# Patient Record
Sex: FEMALE | URBAN - METROPOLITAN AREA
[De-identification: names, ages, dates, MRNs, and addresses within clinical notes are randomized per-mention and may not be internally consistent; named-entity substitution may affect disease eponyms.]

---
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
## 2023-02-08 ENCOUNTER — APPOINTMENT (OUTPATIENT)
Dept: TRANSGENDER CARE | Facility: CLINIC | Age: 18
End: 2023-02-08
Payer: MEDICAID

## 2023-02-08 VITALS
HEART RATE: 89 BPM | SYSTOLIC BLOOD PRESSURE: 108 MMHG | WEIGHT: 278 LBS | BODY MASS INDEX: 46.32 KG/M2 | HEIGHT: 65 IN | DIASTOLIC BLOOD PRESSURE: 72 MMHG | OXYGEN SATURATION: 97 % | TEMPERATURE: 97.2 F

## 2023-02-08 DIAGNOSIS — D68.9 COAGULATION DEFECT, UNSPECIFIED: ICD-10-CM

## 2023-02-08 PROCEDURE — 99214 OFFICE O/P EST MOD 30 MIN: CPT

## 2023-02-08 NOTE — ASSESSMENT
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 Elevated testosterone prior to GAHT 56. PRL normal, TSH normal\par -Discussed that PCOS is a complex genetic disorder with a number of candidate genes and we do not know the exact cause so we treat the symptoms. This is treated differently in a patient who is undergoing gender affirming hormonal transition.\par - discussed that  PCOS means that people are at increased risk of metabolic syndrome, hypertension, and diabetes and that pt will need monitoring throughout their lifetime to minimize risk.\par - pt does not want any future pregnancies/has no fertility concerns\par - advised pt bring prior records for me to review from prior endocrinologist\par - discussed importance of sustained lifestyle modifications to lose weight, improve BMI, glycemic control and hypertriglyceridemia\par - continue metformin 1000 mg bid.\par - gender affirming care below --> on testosterone menses will ideally stop. Pt does not want to be on OCP as this would contradict their masculinizing goals of GAHT care\par - pt will obtain prior labs from outside endocrinologist --> defer further workup at this time until this is reviewed. Pt mentions ?abnormal salivary cortisol testing but was told they have "adrenal fatigue" - discussed that this is not a clinical entity in endocrinology. Would stop taking adrenal supplements. Low clinical suspicion for hypercortisolism.\par \par - GYN has started progesterone monthly to induce menses - can continue for first 6 months on GAHT. Lydia would like to establish care with [facility] so will refer to Dr. Frank\par \par #Gender dysphoria in adult:\par - Discussed options for gender-affirming hormone therapy, goals, risks (erythrocytosis, acne, male pattern hair loss, infertility, increased CV risk) and benefits of therapy, as well as options for fertility preservation (cryopreservation of oocytes/embryos), and surgical intervention. 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

## 2023-02-08 NOTE — HISTORY OF PRESENT ILLNESS
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 Always felt nonbinary masc even when younger. Raised by father and around men and felt they fit in with them. Lives with mom and dad who are  for 10 years, relays home can be very stressful, not out to family. Patient would like to talk to endocrinologist first and psych in house first prior to deciding on hormones. "\par \par Has been talking with therapist. \par Wants to be on testosterone but is concerned about OCD impact.\par Wants to go slower with testosterone, lower doses.\par Received mental health [facility] letter of support.\par \par Menstrual hx\par menarche 5th grade, stopped for months, then was regular only on OCP. Got only one period 7/2022. \par Reports being put on progesterone monthly to induce menses with GYN\par On metformin 1000 mg BID - has been on since 6th grade\par diagnosed by endocrinologist\par no acne history\par has always had dark hair on face, some mild chest hair, nothing excessive but hoping for more\par Was on OCP sixth grade until recently 3/2022, felt like testosterone levels gone up and feels much better, feels like mental health has improved. \par No galactorrhea, or thyroid disease.\par \par Thinks they had abnormal salivary cortisol testing  - thinks they did it throughout the day \par reports lower cortisol and was told they have adrenal fatigue \par taking adrenavive - was told it was for adrenal fatigue\par denies purple stretch marks, denies easy bruising, fracture history, no facial roundness \par is not sure if they are seeing a functional med doctor\par \par Labs:\par testosterone elevated 56\par PRL 19\par TSH 2.28\par A1c 5.2%\par Lipids: Tchol 160, , HDL 30, LDL 95\par \par Was seeing endo.\par On metformin.\par \par no concerns  for fertility, does not want  children \par \par corrected calcium = 10 on 10/14/22\par  \par FHx: Denies breast/ovarian cancer, VTE, CAD/CVA\par  \par SH:\par nonsmoker\par Occupation works at [employer]\par 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

## 2023-02-10 ENCOUNTER — APPOINTMENT (OUTPATIENT)
Dept: TRANSGENDER CARE | Facility: CLINIC | Age: 18
End: 2023-02-10

## 2023-04-12 ENCOUNTER — APPOINTMENT (OUTPATIENT)
Dept: TRANSGENDER CARE | Facility: CLINIC | Age: 18
End: 2023-04-12
Payer: MEDICAID

## 2023-04-12 VITALS
OXYGEN SATURATION: 98 % | HEIGHT: 65 IN | SYSTOLIC BLOOD PRESSURE: 112 MMHG | BODY MASS INDEX: 46.98 KG/M2 | HEART RATE: 82 BPM | TEMPERATURE: 98.4 F | DIASTOLIC BLOOD PRESSURE: 82 MMHG | WEIGHT: 282 LBS

## 2023-04-12 DIAGNOSIS — Z11.3 ENCOUNTER FOR SCREENING FOR INFECTIONS WITH A PREDOMINANTLY SEXUAL MODE OF TRANSMISSION: ICD-10-CM

## 2023-04-12 PROCEDURE — 99215 OFFICE O/P EST HI 40 MIN: CPT

## 2023-04-13 ENCOUNTER — NON-APPOINTMENT (OUTPATIENT)
Age: 18
End: 2023-04-13

## 2023-04-13 LAB
25(OH)D3 SERPL-MCNC: 44.9 NG/ML
ALBUMIN SERPL ELPH-MCNC: 4.5 G/DL
ALP BLD-CCNC: 57 U/L
ALT SERPL-CCNC: 48 U/L
ANION GAP SERPL CALC-SCNC: 15 MMOL/L
AST SERPL-CCNC: 25 U/L
BASOPHILS # BLD AUTO: 0.04 K/UL
BASOPHILS NFR BLD AUTO: 0.5 %
BILIRUB SERPL-MCNC: 0.2 MG/DL
BUN SERPL-MCNC: 19 MG/DL
C TRACH RRNA SPEC QL NAA+PROBE: NOT DETECTED
CALCIUM SERPL-MCNC: 10.3 MG/DL
CALCIUM SERPL-MCNC: 10.3 MG/DL
CHLORIDE SERPL-SCNC: 103 MMOL/L
CHOLEST SERPL-MCNC: 145 MG/DL
CO2 SERPL-SCNC: 24 MMOL/L
CREAT SERPL-MCNC: 0.56 MG/DL
EGFR: 135 ML/MIN/1.73M2
EOSINOPHIL # BLD AUTO: 0.11 K/UL
EOSINOPHIL NFR BLD AUTO: 1.4 %
ESTIMATED AVERAGE GLUCOSE: 105 MG/DL
ESTRADIOL SERPL-MCNC: 34 PG/ML
GLUCOSE SERPL-MCNC: 94 MG/DL
HBA1C MFR BLD HPLC: 5.3 %
HCG SERPL-MCNC: <1 MIU/ML
HCT VFR BLD CALC: 41 %
HDLC SERPL-MCNC: 24 MG/DL
HGB BLD-MCNC: 12.7 G/DL
IMM GRANULOCYTES NFR BLD AUTO: 0.6 %
LDLC SERPL CALC-MCNC: 82 MG/DL
LYMPHOCYTES # BLD AUTO: 2.13 K/UL
LYMPHOCYTES NFR BLD AUTO: 27.4 %
MAN DIFF?: NORMAL
MCHC RBC-ENTMCNC: 28.9 PG
MCHC RBC-ENTMCNC: 31 GM/DL
MCV RBC AUTO: 93.2 FL
MONOCYTES # BLD AUTO: 1.06 K/UL
MONOCYTES NFR BLD AUTO: 13.7 %
N GONORRHOEA RRNA SPEC QL NAA+PROBE: NOT DETECTED
NEUTROPHILS # BLD AUTO: 4.37 K/UL
NEUTROPHILS NFR BLD AUTO: 56.4 %
NONHDLC SERPL-MCNC: 121 MG/DL
PARATHYROID HORMONE INTACT: 13 PG/ML
PLATELET # BLD AUTO: 345 K/UL
POTASSIUM SERPL-SCNC: 4.2 MMOL/L
PROT SERPL-MCNC: 7 G/DL
RBC # BLD: 4.4 M/UL
RBC # FLD: 13.5 %
SODIUM SERPL-SCNC: 141 MMOL/L
SOURCE AMPLIFICATION: NORMAL
T PALLIDUM AB SER QL IA: NEGATIVE
TESTOST SERPL-MCNC: 611 NG/DL
TRIGL SERPL-MCNC: 195 MG/DL
WBC # FLD AUTO: 7.76 K/UL

## 2023-04-13 NOTE — ASSESSMENT
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 Elevated testosterone prior to [facility] 56. PRL normal, TSH normal\par -Discussed that PCOS is a complex genetic disorder with a number of candidate genes and we do not know the exact cause so we treat the symptoms. This is treated differently in a patient who is undergoing gender affirming hormonal transition.\par - discussed that  PCOS means that people are at increased risk of metabolic syndrome, hypertension, and diabetes and that pt will need monitoring throughout their lifetime to minimize risk.\par - pt does not want any future pregnancies/has no fertility concerns\par - advised pt bring prior records for me to review from prior endocrinologist\par - discussed importance of sustained lifestyle modifications to lose weight, improve BMI, glycemic control and hypertriglyceridemia\par - continue metformin 1000 mg bid. not interested in GLP1 agonist\par - gender affirming care below --> on testosterone menses will stop. Pt does not want to be on OCP as this would contradict their masculinizing goals of [facility] care\par - pt will obtain prior labs from outside endocrinologist --> defer further workup at this time until this is reviewed. Pt mentions ?abnormal salivary cortisol testing but was told they have "adrenal fatigue" - discussed that this is not a clinical entity in endocrinology. Would stop taking adrenal supplements. Low clinical suspicion for hypercortisolism.\par - now off progesterone from GYN\par - previously referred pt to Dr. Frank\par \par #Gender dysphoria in adult:\par - Discussed options for gender-affirming hormone therapy, goals, risks (erythrocytosis, acne, male pattern hair loss, infertility, increased CV risk) and benefits of therapy, as well as options for fertility preservation (cryopreservation of oocytes/embryos), and surgical intervention. The patient is not interested in fertility preservation.\par -Expected timeline for changes to occur (cessation of menses, deep voice, increase in muscle bulk, acne, increased libido) in 3-6 months, longer term changes include male pattern hair loss, clitoral enlargement.\par -Counseled that pregnancy can still occur on GAHT and that patient must avoid pregnancy while taking GAHT \par - continue testosterone gel 1.62% 2 pumps daily (started in 2/2023)\par \par  Pt requesting HCG testing. Understands pregnancy is possible while on GAHT. Discussed safe sex practices. Pt expresses dysphoria by the idea of becoming pregnant. Reports they would "kill themselves" if pregnant. Pt denies active plan to harm their self or others, does not have weapons at home and does not have active thoughts to harm themselves now. Understands that if pregnant, termination would be an option. 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

## 2023-04-13 NOTE — HISTORY OF PRESENT ILLNESS
[FreeTextEntry1] : CC: Gender affirming hormone therapy\par HPI: Lydia is a 18 yo AFAB transmasculine nonbinary (he/they) person with PMH of PCOS, PANDAS, OCD, YULIA, depression,  migraines, +MTFHR gene (reports increased clotting risk), here for gender affirming hormone evaluation.\par \par Transition hx: per visit with NP Gemini Keller in 10/2022\par "Out to friends, relays they aren’t taken very seriously but that they also don’t talk about it a lot. Always felt nonbinary masc even when younger. Raised by father and around men and felt they fit in with them. Lives with mom and dad who are  for 10 years, relays home can be very stressful, not out to family. Patient would like to talk to endocrinologist first and psych in house first prior to deciding on hormones. "\par \par Has been talking with therapist. \par Wants to be on testosterone but is concerned about OCD impact.\par Wants to go slower with testosterone, lower doses.\par Received mental health [facility] letter of support.\par \par Menstrual hx\par menarche 5th grade, stopped for months, then was regular only on OCP. Got only one period 7/2022. \par Reports being put on progesterone monthly to induce menses with GYN\par On metformin 1000 mg BID - has been on since 6th grade\par diagnosed by endocrinologist\par no acne history\par has always had dark hair on face, some mild chest hair, nothing excessive but hoping for more\par Was on OCP sixth grade until recently 3/2022, felt like testosterone levels gone up and feels much better, feels like mental health has improved. \par No galactorrhea, or thyroid disease.\par \par Thinks they had abnormal salivary cortisol testing  - thinks they did it throughout the day \par reports lower cortisol and was told they have adrenal fatigue \par taking adrenavive - was told it was for adrenal fatigue\par denies purple stretch marks, denies easy bruising, fracture history, no facial roundness \par is not sure if they are seeing a functional med doctor\par \par Labs:\par testosterone elevated 56\par PRL 19\par TSH 2.28\par A1c 5.2%\par Lipids: Tchol 160, , HDL 30, LDL 95\par \par Was seeing endo.\par On metformin 1 g bid. 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 \par \par interval hx:\par on testosterone gel 1.62% 2 pumps daily (started in 2/2023)\par feels like getting more hormone related migraine, gets overheated, feels like they can manage symptoms\par unsure if they want to reduce dosing; has been having spikes of anxiety since starting testosterone, does not want to take a break\par has gained 10 lbs, walks a lot \par has been eating more \par reports eating disorder in the past\par seeing therapist \par not interested in glp1\par has increased hair growth\par no menses\par off progesterone \par two weeks ago had intercourse with cis male, worried about  pregnancy, reports severe dysphoria by the idea that their body can become pregnant. If pregnant "would kill themselves" - pt denies active plan, does not have weapons at home and does not have active thoughts to harm themselves now. Understands that if pregnant, termination would be an option. \par \par cell [phone]\par \par \par

## 2023-06-28 ENCOUNTER — RX RENEWAL (OUTPATIENT)
Age: 18
End: 2023-06-28

## 2023-08-30 ENCOUNTER — APPOINTMENT (OUTPATIENT)
Dept: TRANSGENDER CARE | Facility: CLINIC | Age: 18
End: 2023-08-30
Payer: MEDICAID

## 2023-08-30 VITALS
WEIGHT: 293 LBS | HEART RATE: 85 BPM | HEIGHT: 65 IN | SYSTOLIC BLOOD PRESSURE: 110 MMHG | BODY MASS INDEX: 48.82 KG/M2 | DIASTOLIC BLOOD PRESSURE: 82 MMHG | OXYGEN SATURATION: 100 % | TEMPERATURE: 98 F

## 2023-08-30 DIAGNOSIS — Z86.39 PERSONAL HISTORY OF OTHER ENDOCRINE, NUTRITIONAL AND METABOLIC DISEASE: ICD-10-CM

## 2023-08-30 PROCEDURE — 99214 OFFICE O/P EST MOD 30 MIN: CPT

## 2023-08-30 NOTE — ASSESSMENT
[FreeTextEntry1] : Lydia is a 17 yo AFAB transmasculine nonbinary (he/they) person with PMH of PCOS, PANDAS, OCD, YULIA, depression,  migraines, +MTFHR gene (increased clotting risk), here for gender affirming hormone evaluation.  #PCOS: suspect PCOS based on clinical hx (meets NIH criteria by irregular menses/chronic anovulation, biochemical hyperandrogenism) and other etiologies ruled out. Elevated testosterone prior to [facility] 56. PRL normal, TSH normal -Discussed that PCOS is a complex genetic disorder with a number of candidate genes and we do not know the exact cause so we treat the symptoms. This is treated differently in a patient who is undergoing gender affirming hormonal transition. - discussed that PCOS means that people are at increased risk of metabolic syndrome, hypertension, and diabetes and that pt will need monitoring throughout their lifetime to minimize risk. - pt does not want any future pregnancies/has no fertility concerns - advised pt bring prior records for me to review from prior endocrinologist - discussed importance of sustained lifestyle modifications to lose weight, improve BMI, glycemic control and hypertriglyceridemia - continue metformin 1000 mg bid. not interested in GLP1 agonist; can  reduce to 1 tab daily while having GI issues  - gender affirming care below --> on testosterone menses will stop. Pt does not want to be on OCP as this would contradict their masculinizing goals of [facility] care - pt will obtain prior labs from outside endocrinologist --> defer further workup at this time until this is reviewed. Pt mentions ?abnormal salivary cortisol testing but was told they have "adrenal fatigue" - previously discussed that this is not a clinical entity in endocrinology. Would stop taking adrenal supplements. Low clinical suspicion for hypercortisolism. - now off progesterone from GYN - previously referred pt to Dr. Frank  #Gender dysphoria in adult: - Discussed options for gender-affirming hormone therapy, goals, risks (erythrocytosis, acne, male pattern hair loss, infertility, increased CV risk) and benefits of therapy, as well as options for fertility preservation (cryopreservation of oocytes/embryos), and surgical intervention. The patient is not interested in fertility preservation. -Expected timeline for changes to occur (cessation of menses, deep voice, increase in muscle bulk, acne, increased libido) in 3-6 months, longer term changes include male pattern hair loss, clitoral enlargement. -Counseled that pregnancy can still occur on GAHT and that patient must avoid pregnancy while taking GAHT  - continue testosterone gel 1.62% 2 pumps daily (started in 2/2023), consider injections at future visit per pt preference  -Understands pregnancy is possible while on GAHT. Discussed safe sex practices. Pt expresses dysphoria by the idea of becoming pregnant.   #+MTFHR gene-increased clotting risk reported - referred to hematology for further evaluation previously  - discussed increased VTE risk on testosterone  #hypercalcemia, resolved - PTH low, normal calcium - monitor   Seeing GI for abdominal pain next week can check celiac panel follow up with pcp  Return for follow up visit in 2 months cell [phone]

## 2023-08-30 NOTE — HISTORY OF PRESENT ILLNESS
[FreeTextEntry1] : CC: Gender affirming hormone therapy HPI: Lydia is a 17 yo AFAB transmasculine nonbinary (he/they) person with PMH of PCOS, PANDAS, OCD, YULIA, depression,  migraines, +MTFHR gene (reports increased clotting risk), here for gender affirming hormone evaluation.  Transition hx: per visit with NP Gemini Keller in 10/2022 "Out to friends, relays they aren't taken very seriously but that they also don't talk about it a lot. Always felt nonbinary masc even when younger. Raised by father and around men and felt they fit in with them. Lives with mom and dad who are  for 10 years, relays home can be very stressful, not out to family. Patient would like to talk to endocrinologist first and psych in house first prior to deciding on hormones. "  Has been talking with therapist.  Wants to be on testosterone but is concerned about OCD impact. Wants to go slower with testosterone, lower doses. Received mental health [facility] letter of support.  Menstrual hx menarche 5th grade, stopped for months, then was regular only on OCP. Got only one period 7/2022.  Reports being put on progesterone monthly to induce menses with GYN On metformin 1000 mg BID - has been on since 6th grade diagnosed by endocrinologist no acne history has always had dark hair on face, some mild chest hair, nothing excessive but hoping for more Was on OCP sixth grade until recently 3/2022, felt like testosterone levels gone up and feels much better, feels like mental health has improved.  No galactorrhea, or thyroid disease.  Thinks they had abnormal salivary cortisol testing  - thinks they did it throughout the day  reports lower cortisol and was told they have adrenal fatigue  taking adrenavive - was told it was for adrenal fatigue denies purple stretch marks, denies easy bruising, fracture history, no facial roundness  is not sure if they are seeing a [facility] med doctor  Labs: testosterone elevated 56 PRL 19 TSH 2.28 A1c 5.2% Lipids: Tchol 160, , HDL 30, LDL 95  Was seeing endo. On metformin 1 g bid.   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 notes that they are on antibiotics and reports they are on this intermittently for ?chronic strep infection in brain  no  discharge no unprotected intercourse  does not want std testing today  not interested in glp1 has increased hair growth no menses off progesterone   cell [phone]

## 2023-08-31 ENCOUNTER — TRANSCRIPTION ENCOUNTER (OUTPATIENT)
Age: 18
End: 2023-08-31

## 2023-08-31 ENCOUNTER — NON-APPOINTMENT (OUTPATIENT)
Age: 18
End: 2023-08-31

## 2023-08-31 LAB
ALBUMIN SERPL ELPH-MCNC: 4.5 G/DL
ALP BLD-CCNC: 58 U/L
ALT SERPL-CCNC: 53 U/L
ANION GAP SERPL CALC-SCNC: 12 MMOL/L
AST SERPL-CCNC: 27 U/L
BASOPHILS # BLD AUTO: 0.03 K/UL
BASOPHILS NFR BLD AUTO: 0.4 %
BILIRUB SERPL-MCNC: 0.2 MG/DL
BUN SERPL-MCNC: 15 MG/DL
CALCIUM SERPL-MCNC: 9.8 MG/DL
CHLORIDE SERPL-SCNC: 103 MMOL/L
CHOLEST SERPL-MCNC: 133 MG/DL
CO2 SERPL-SCNC: 25 MMOL/L
CREAT SERPL-MCNC: 0.59 MG/DL
EGFR: 132 ML/MIN/1.73M2
ENDOMYSIUM IGA SER QL: NEGATIVE
ENDOMYSIUM IGA TITR SER: NORMAL
EOSINOPHIL # BLD AUTO: 0.14 K/UL
EOSINOPHIL NFR BLD AUTO: 1.7 %
ESTIMATED AVERAGE GLUCOSE: 111 MG/DL
ESTRADIOL SERPL-MCNC: 36 PG/ML
GLIADIN IGA SER QL: <5 UNITS
GLIADIN IGG SER QL: <5 UNITS
GLIADIN PEPTIDE IGA SER-ACNC: NEGATIVE
GLIADIN PEPTIDE IGG SER-ACNC: NEGATIVE
GLUCOSE SERPL-MCNC: 108 MG/DL
HBA1C MFR BLD HPLC: 5.5 %
HCT VFR BLD CALC: 41.5 %
HDLC SERPL-MCNC: 25 MG/DL
HGB BLD-MCNC: 13.5 G/DL
IMM GRANULOCYTES NFR BLD AUTO: 0.5 %
LDLC SERPL CALC-MCNC: 78 MG/DL
LYMPHOCYTES # BLD AUTO: 2.88 K/UL
LYMPHOCYTES NFR BLD AUTO: 35.8 %
MAN DIFF?: NORMAL
MCHC RBC-ENTMCNC: 29.3 PG
MCHC RBC-ENTMCNC: 32.5 GM/DL
MCV RBC AUTO: 90.2 FL
MONOCYTES # BLD AUTO: 0.71 K/UL
MONOCYTES NFR BLD AUTO: 8.8 %
NEUTROPHILS # BLD AUTO: 4.24 K/UL
NEUTROPHILS NFR BLD AUTO: 52.8 %
NONHDLC SERPL-MCNC: 109 MG/DL
PLATELET # BLD AUTO: 276 K/UL
POTASSIUM SERPL-SCNC: 4.6 MMOL/L
PROT SERPL-MCNC: 6.9 G/DL
RBC # BLD: 4.6 M/UL
RBC # FLD: 13.2 %
SODIUM SERPL-SCNC: 140 MMOL/L
TESTOST SERPL-MCNC: 131 NG/DL
TRIGL SERPL-MCNC: 179 MG/DL
TTG IGA SER IA-ACNC: <1.2 U/ML
TTG IGA SER-ACNC: NEGATIVE
TTG IGG SER IA-ACNC: 2.4 U/ML
TTG IGG SER IA-ACNC: NEGATIVE
WBC # FLD AUTO: 8.04 K/UL

## 2023-09-05 ENCOUNTER — TRANSCRIPTION ENCOUNTER (OUTPATIENT)
Age: 18
End: 2023-09-05

## 2023-09-06 ENCOUNTER — APPOINTMENT (OUTPATIENT)
Dept: GASTROENTEROLOGY | Facility: CLINIC | Age: 18
End: 2023-09-06
Payer: MEDICAID

## 2023-09-06 VITALS
HEART RATE: 67 BPM | BODY MASS INDEX: 48.82 KG/M2 | OXYGEN SATURATION: 98 % | WEIGHT: 293 LBS | HEIGHT: 65 IN | DIASTOLIC BLOOD PRESSURE: 85 MMHG | SYSTOLIC BLOOD PRESSURE: 129 MMHG | TEMPERATURE: 97.2 F

## 2023-09-06 DIAGNOSIS — R11.0 NAUSEA: ICD-10-CM

## 2023-09-06 DIAGNOSIS — R19.5 OTHER FECAL ABNORMALITIES: ICD-10-CM

## 2023-09-06 DIAGNOSIS — R10.9 UNSPECIFIED ABDOMINAL PAIN: ICD-10-CM

## 2023-09-06 DIAGNOSIS — K92.89 OTHER SPECIFIED DISEASES OF THE DIGESTIVE SYSTEM: ICD-10-CM

## 2023-09-06 DIAGNOSIS — K58.9 IRRITABLE BOWEL SYNDROME W/OUT DIARRHEA: ICD-10-CM

## 2023-09-06 DIAGNOSIS — R11.2 NAUSEA WITH VOMITING, UNSPECIFIED: ICD-10-CM

## 2023-09-06 PROCEDURE — 99204 OFFICE O/P NEW MOD 45 MIN: CPT

## 2023-09-06 NOTE — PHYSICAL EXAM
[Alert] : alert [Normal Voice/Communication] : normal voice/communication [Healthy Appearing] : healthy appearing [No Acute Distress] : no acute distress [Sclera] : the sclera and conjunctiva were normal [Hearing Threshold Finger Rub Not Sunflower] : hearing was normal [Normal Lips/Gums] : the lips and gums were normal [Oropharynx] : the oropharynx was normal [Normal Appearance] : the appearance of the neck was normal [No Neck Mass] : no neck mass was observed [No Respiratory Distress] : no respiratory distress [No Acc Muscle Use] : no accessory muscle use [Respiration, Rhythm And Depth] : normal respiratory rhythm and effort [Auscultation Breath Sounds / Voice Sounds] : lungs were clear to auscultation bilaterally [Heart Rate And Rhythm] : heart rate was normal and rhythm regular [Normal S1, S2] : normal S1 and S2 [Murmurs] : no murmurs [Bowel Sounds] : normal bowel sounds [No Masses] : no abdominal mass palpated [Abdomen Soft] : soft [] : no hepatosplenomegaly [RUQ] : RUQ [Oriented To Time, Place, And Person] : oriented to person, place, and time

## 2023-09-06 NOTE — HISTORY OF PRESENT ILLNESS
[FreeTextEntry1] : Few months of nausea, vomiting, and indigestion   Currently on Amoxicillin for PANDAS condition takes usually  On Metformin for PCOS

## 2023-09-06 NOTE — ASSESSMENT
[FreeTextEntry1] : A low acid / reflux diet was discussed in great detail including not smoking, not drinking alcohol, and not consuming foods that irritate the esophagus. It is helpful to eat small meals throughout the day instead of large meals. You should avoid eating before bedtime or lying down after you eat. It can be helpful to raise the head of your bed six inches. Additionally, you should maintain a healthy weight and good posture.. The patient was given written material to take home and review.

## 2023-09-07 ENCOUNTER — LABORATORY RESULT (OUTPATIENT)
Age: 18
End: 2023-09-07

## 2023-09-07 ENCOUNTER — APPOINTMENT (OUTPATIENT)
Dept: ULTRASOUND IMAGING | Facility: CLINIC | Age: 18
End: 2023-09-07
Payer: MEDICAID

## 2023-09-07 PROCEDURE — 76700 US EXAM ABDOM COMPLETE: CPT

## 2023-09-11 LAB
BARLEY IGE QN: <0.1 KUA/L
CHERRY IGE QN: <0.1 KUA/L
COW MILK IGE QN: <0.1 KUA/L
CRAB IGE QN: <0.1 KUA/L
DEPRECATED BARLEY IGE RAST QL: 0
DEPRECATED CHERRY IGE RAST QL: 0
DEPRECATED COW MILK IGE RAST QL: 0
DEPRECATED CRAB IGE RAST QL: 0
DEPRECATED EGG WHITE IGE RAST QL: 0
DEPRECATED OAT IGE RAST QL: 0
DEPRECATED PEANUT IGE RAST QL: 0
DEPRECATED RYE IGE RAST QL: 0
DEPRECATED SOYBEAN IGE RAST QL: 0
DEPRECATED WHEAT IGE RAST QL: NORMAL
EGG WHITE IGE QN: <0.1 KUA/L
OAT IGE QN: <0.1 KUA/L
PEANUT IGE QN: <0.1 KUA/L
RYE IGE QN: <0.1 KUA/L
SOYBEAN IGE QN: <0.1 KUA/L
TOTAL IGE SMQN RAST: 498 KU/L
TSH SERPL-ACNC: 2.57 UIU/ML
WHEAT IGE QN: 0.12 KUA/L

## 2023-09-15 ENCOUNTER — NON-APPOINTMENT (OUTPATIENT)
Age: 18
End: 2023-09-15

## 2023-09-18 LAB
CELIAC DISEASE INTERPRETATION: NORMAL
CELIAC GENE PAIRS PRESENT: NO
DQ ALPHA 1: NORMAL
DQ BETA 1: NORMAL
IMMUNOGLOBULIN A (IGA): 100 MG/DL

## 2023-09-19 ENCOUNTER — TRANSCRIPTION ENCOUNTER (OUTPATIENT)
Age: 18
End: 2023-09-19

## 2023-09-21 LAB — HEMOCCULT STL QL IA: NEGATIVE

## 2023-09-25 ENCOUNTER — NON-APPOINTMENT (OUTPATIENT)
Age: 18
End: 2023-09-25

## 2023-09-26 LAB
BACTERIA STL CULT: NORMAL
H PYLORI AG STL QL: NEGATIVE

## 2023-09-27 ENCOUNTER — RX RENEWAL (OUTPATIENT)
Age: 18
End: 2023-09-27

## 2023-09-29 LAB
CALPROTECTIN FECAL: 24 UG/G
LACTOFERRIN STL-MCNC: 2.81 CD:794062635

## 2023-10-02 LAB — PANCREATIC ELASTASE, FECAL: 286 CD:794062645

## 2023-10-04 ENCOUNTER — APPOINTMENT (OUTPATIENT)
Dept: TRANSGENDER CARE | Facility: CLINIC | Age: 18
End: 2023-10-04
Payer: MEDICAID

## 2023-10-04 VITALS
DIASTOLIC BLOOD PRESSURE: 78 MMHG | OXYGEN SATURATION: 99 % | WEIGHT: 272 LBS | SYSTOLIC BLOOD PRESSURE: 110 MMHG | BODY MASS INDEX: 45.32 KG/M2 | TEMPERATURE: 97.8 F | HEART RATE: 46 BPM | HEIGHT: 65 IN

## 2023-10-04 DIAGNOSIS — E28.2 POLYCYSTIC OVARIAN SYNDROME: ICD-10-CM

## 2023-10-04 PROCEDURE — 99214 OFFICE O/P EST MOD 30 MIN: CPT

## 2023-10-06 ENCOUNTER — TRANSCRIPTION ENCOUNTER (OUTPATIENT)
Age: 18
End: 2023-10-06

## 2023-10-09 ENCOUNTER — TRANSCRIPTION ENCOUNTER (OUTPATIENT)
Age: 18
End: 2023-10-09

## 2023-10-09 LAB
ALBUMIN SERPL ELPH-MCNC: 4.7 G/DL
ALP BLD-CCNC: 53 U/L
ALT SERPL-CCNC: 58 U/L
ANION GAP SERPL CALC-SCNC: 14 MMOL/L
AST SERPL-CCNC: 31 U/L
BASOPHILS # BLD AUTO: 0.03 K/UL
BASOPHILS NFR BLD AUTO: 0.5 %
BILIRUB SERPL-MCNC: 0.5 MG/DL
BUN SERPL-MCNC: 13 MG/DL
CALCIUM SERPL-MCNC: 10.2 MG/DL
CHLORIDE SERPL-SCNC: 102 MMOL/L
CO2 SERPL-SCNC: 22 MMOL/L
CREAT SERPL-MCNC: 0.67 MG/DL
EGFR: 128 ML/MIN/1.73M2
EOSINOPHIL # BLD AUTO: 0.15 K/UL
EOSINOPHIL NFR BLD AUTO: 2.6 %
ESTRADIOL SERPL-MCNC: 30 PG/ML
GLUCOSE SERPL-MCNC: 99 MG/DL
HCT VFR BLD CALC: 45.4 %
HGB BLD-MCNC: 14.1 G/DL
IMM GRANULOCYTES NFR BLD AUTO: 0 %
LYMPHOCYTES # BLD AUTO: 2.41 K/UL
LYMPHOCYTES NFR BLD AUTO: 42 %
MAN DIFF?: NORMAL
MCHC RBC-ENTMCNC: 29.1 PG
MCHC RBC-ENTMCNC: 31.1 GM/DL
MCV RBC AUTO: 93.6 FL
MONOCYTES # BLD AUTO: 0.64 K/UL
MONOCYTES NFR BLD AUTO: 11.1 %
NEUTROPHILS # BLD AUTO: 2.51 K/UL
NEUTROPHILS NFR BLD AUTO: 43.8 %
PLATELET # BLD AUTO: 252 K/UL
POTASSIUM SERPL-SCNC: 4.1 MMOL/L
PROT SERPL-MCNC: 7.3 G/DL
RBC # BLD: 4.85 M/UL
RBC # FLD: 12.8 %
SODIUM SERPL-SCNC: 138 MMOL/L
TESTOST SERPL-MCNC: 242 NG/DL
WBC # FLD AUTO: 5.74 K/UL

## 2023-10-09 RX ORDER — ISOPROPYL ALCOHOL 70 ML/100ML
SWAB TOPICAL
Qty: 1 | Refills: 3 | Status: ACTIVE | COMMUNITY
Start: 2023-10-09 | End: 1900-01-01

## 2023-10-09 RX ORDER — TESTOSTERONE 20.25 MG/1.25G
1.62 GEL, METERED TRANSDERMAL
Qty: 75 | Refills: 0 | Status: DISCONTINUED | COMMUNITY
Start: 2023-09-27 | End: 2023-10-09

## 2023-10-09 RX ORDER — TESTOSTERONE 16.2 MG/G
20.25 MG/ACT GEL TRANSDERMAL
Qty: 1 | Refills: 0 | Status: DISCONTINUED | COMMUNITY
Start: 2023-02-08 | End: 2023-10-09

## 2023-10-10 ENCOUNTER — TRANSCRIPTION ENCOUNTER (OUTPATIENT)
Age: 18
End: 2023-10-10

## 2023-11-03 ENCOUNTER — TRANSCRIPTION ENCOUNTER (OUTPATIENT)
Age: 18
End: 2023-11-03

## 2023-11-07 ENCOUNTER — RX RENEWAL (OUTPATIENT)
Age: 18
End: 2023-11-07

## 2023-11-17 NOTE — ED BEHAVIORAL HEALTH ASSESSMENT NOTE - ELEVATED CHRONIC RISK
Dutasteride Pregnancy And Lactation Text: This medication is absolutely contraindicated in women, especially during pregnancy and breast feeding. Feminization of male fetuses is possible if taking while pregnant. Yes

## 2023-11-29 ENCOUNTER — TRANSCRIPTION ENCOUNTER (OUTPATIENT)
Age: 18
End: 2023-11-29

## 2023-12-01 ENCOUNTER — TRANSCRIPTION ENCOUNTER (OUTPATIENT)
Age: 18
End: 2023-12-01

## 2023-12-12 ENCOUNTER — TRANSCRIPTION ENCOUNTER (OUTPATIENT)
Age: 18
End: 2023-12-12

## 2023-12-12 RX ORDER — ONDANSETRON 4 MG/1
4 TABLET ORAL
Qty: 30 | Refills: 0 | Status: ACTIVE | COMMUNITY
Start: 2023-09-06 | End: 1900-01-01

## 2024-02-21 ENCOUNTER — APPOINTMENT (OUTPATIENT)
Dept: TRANSGENDER CARE | Facility: CLINIC | Age: 19
End: 2024-02-21
Payer: MEDICAID

## 2024-02-21 VITALS
HEIGHT: 65 IN | HEART RATE: 75 BPM | SYSTOLIC BLOOD PRESSURE: 112 MMHG | DIASTOLIC BLOOD PRESSURE: 78 MMHG | WEIGHT: 262 LBS | OXYGEN SATURATION: 95 % | BODY MASS INDEX: 43.65 KG/M2 | TEMPERATURE: 97.5 F

## 2024-02-21 DIAGNOSIS — F64.9 GENDER IDENTITY DISORDER, UNSPECIFIED: ICD-10-CM

## 2024-02-21 DIAGNOSIS — N95.2 POSTMENOPAUSAL ATROPHIC VAGINITIS: ICD-10-CM

## 2024-02-21 DIAGNOSIS — B34.9 VIRAL INFECTION, UNSPECIFIED: ICD-10-CM

## 2024-02-21 DIAGNOSIS — F60.3 BORDERLINE PERSONALITY DISORDER: ICD-10-CM

## 2024-02-21 PROCEDURE — 36415 COLL VENOUS BLD VENIPUNCTURE: CPT

## 2024-02-21 PROCEDURE — 99215 OFFICE O/P EST HI 40 MIN: CPT

## 2024-02-21 RX ORDER — RIMEGEPANT SULFATE 75 MG/75MG
75 TABLET, ORALLY DISINTEGRATING ORAL EVERY OTHER DAY
Refills: 0 | Status: COMPLETED | COMMUNITY
Start: 2022-10-12 | End: 2024-02-21

## 2024-02-21 RX ORDER — AMOXICILLIN AND CLAVULANATE POTASSIUM 875; 125 MG/1; MG/1
875-125 TABLET, COATED ORAL DAILY
Refills: 0 | Status: COMPLETED | COMMUNITY
Start: 2022-10-12 | End: 2024-02-21

## 2024-02-21 RX ORDER — SUMATRIPTAN 100 MG/1
100 TABLET, FILM COATED ORAL
Qty: 12 | Refills: 1 | Status: COMPLETED | COMMUNITY
Start: 2022-10-12 | End: 2024-02-21

## 2024-02-21 RX ORDER — FLUTICASONE PROPIONATE 50 UG/1
50 SPRAY, METERED NASAL TWICE DAILY
Qty: 1 | Refills: 2 | Status: ACTIVE | COMMUNITY
Start: 2024-02-21 | End: 1900-01-01

## 2024-02-21 RX ORDER — MULTIVIT WITH MIN/MFOLATE/K2 340-15/3 G
POWDER (GRAM) ORAL
Refills: 0 | Status: COMPLETED | COMMUNITY
Start: 2022-10-12 | End: 2024-02-21

## 2024-02-21 RX ORDER — ESTRADIOL 0.1 MG/G
0.1 CREAM VAGINAL
Qty: 1 | Refills: 2 | Status: ACTIVE | COMMUNITY
Start: 2024-02-21 | End: 1900-01-01

## 2024-02-21 NOTE — HEALTH RISK ASSESSMENT
[PHQ-2 Positive] : PHQ-2 Positive [PHQ-9 Positive] : PHQ-9 Positive [I have developed a follow-up plan documented below in the note.] : I have developed a follow-up plan documented below in the note. [HIV test declined] : HIV test declined [Hepatitis C test declined] : Hepatitis C test declined [PapSmearComments] : @19 referred to gyn

## 2024-02-21 NOTE — HISTORY OF PRESENT ILLNESS
[FreeTextEntry1] : f/u [de-identified] : Patient here today for gaht f/u.  Has been on gaht for past year. They report taking it day a later than used to, school has been so busy. He is feeling good on gaht. He has been on injections since late oct. He feels same on it mostly the same as gel.   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[employer] going back up there today  MH: patient following with psychiatry and therapist, feels stable on medications denies any thoughts of hurting self or anyone else  Sexual Hx: Relationship status: in relationship Partners (tell me about the genders and bodies of partners, any sperm producing partners): sperm producing partners Practices (types of sex, monogamous/polyamorous): monogamous Protections from STIs (condoms, OCP, LARC, PrEP, etc.): using condoms Past Hx of STIs: none Pregnancy (Desire or Hx): none LMP: menses stopped on t, last march  Patient reports 1 sexual partners in the last 3 months. Last sexual encounter was yesterday  He reports having bleeding with intercourse, denies any pain. He thinks he could be experiencing vaginal atrophy with the testosterone. He reports neurologist advised never to take estradiol/ocp d/t patient's migraine with aura.   Patient has virus currently, feeling fine and having sore throat, sinus pain, and congestion. Tested covid negative. They are taking dayquil, gets some relief. He reports s/s are getting better.   Patient denies any headache visual changes, dizziness, chest pain, SOB, palpitations, wheezing, cough, abdominal pain, nausea, vomiting, diarrhea, joint pain, leg swelling or leg pain. No other health concerns today.

## 2024-02-21 NOTE — PLAN
[FreeTextEntry1] : Gender Dysphoria: endo notes reviewed and appreciated. stable. Will order routine blood work and f/u results to patient once made available - will send [facility] message with results and dose adjustment if needed. F/u 3 months endo. Advised patient to call with any questions or concerns. Patient verbalized understanding.   BPD: stable, patient denies any thoughts of hurting self or anyone else at time of visit. Continue care under therapist/psych. Advised patient to call with any questions or concerns. Patient verbalized understanding.   Viral Syndrome: Discussed with patient likely allergic/viral. Advised patient to use flonase 1 spray in each nostril once in the morning and once before bed, mucinex 600 mg q12h, zyrtec 10mg 1qd, and tylenol prn pain as per package instructions not to exceed 4g. Educated patient to call/f/u if no improvement or worsening symptoms. Advised to go to ED with any high fevers, dysphagia, sob, chest pain, palpitations, worsening headache etc. Advised patient to call with any questions or concerns. Patient verbalized understanding.   Vaginal Atrophy: start estrace cream and f/u gyn referrals provided. Discussed following up with neurologist prior to initiating cream. Advised patient low systemic absorption localized cream, stop med with any migraines with aura. Educated pt to go to hospital with any worst headache of life/thunderclap headache, gait disturbances, chest pain, palpitations, acute neuro changes (slurred speech, muscle weakness, numbness/tingling, visual disturbances etc.) .Advised patient to call with any questions or concerns. Patient verbalized understanding.

## 2024-02-22 ENCOUNTER — TRANSCRIPTION ENCOUNTER (OUTPATIENT)
Age: 19
End: 2024-02-22

## 2024-02-22 LAB
25(OH)D3 SERPL-MCNC: 39.4 NG/ML
ALBUMIN SERPL ELPH-MCNC: 4.3 G/DL
ALP BLD-CCNC: 70 U/L
ALT SERPL-CCNC: 31 U/L
ANION GAP SERPL CALC-SCNC: 15 MMOL/L
AST SERPL-CCNC: 19 U/L
BASOPHILS # BLD AUTO: 0.05 K/UL
BASOPHILS NFR BLD AUTO: 0.5 %
BILIRUB SERPL-MCNC: 0.2 MG/DL
BUN SERPL-MCNC: 14 MG/DL
CALCIUM SERPL-MCNC: 10 MG/DL
CHLORIDE SERPL-SCNC: 102 MMOL/L
CHOLEST SERPL-MCNC: 131 MG/DL
CO2 SERPL-SCNC: 22 MMOL/L
CREAT SERPL-MCNC: 0.64 MG/DL
EGFR: 130 ML/MIN/1.73M2
EOSINOPHIL # BLD AUTO: 0.34 K/UL
EOSINOPHIL NFR BLD AUTO: 3.3 %
ESTIMATED AVERAGE GLUCOSE: 105 MG/DL
ESTRADIOL SERPL-MCNC: 31 PG/ML
GLUCOSE SERPL-MCNC: 86 MG/DL
HBA1C MFR BLD HPLC: 5.3 %
HCG SERPL-MCNC: <1 MIU/ML
HCT VFR BLD CALC: 40.5 %
HDLC SERPL-MCNC: 24 MG/DL
HGB BLD-MCNC: 13.5 G/DL
IMM GRANULOCYTES NFR BLD AUTO: 0.7 %
LDLC SERPL CALC-MCNC: 76 MG/DL
LYMPHOCYTES # BLD AUTO: 2.62 K/UL
LYMPHOCYTES NFR BLD AUTO: 25.2 %
MAN DIFF?: NORMAL
MCHC RBC-ENTMCNC: 30.2 PG
MCHC RBC-ENTMCNC: 33.3 GM/DL
MCV RBC AUTO: 90.6 FL
MONOCYTES # BLD AUTO: 1.21 K/UL
MONOCYTES NFR BLD AUTO: 11.6 %
NEUTROPHILS # BLD AUTO: 6.1 K/UL
NEUTROPHILS NFR BLD AUTO: 58.7 %
NONHDLC SERPL-MCNC: 106 MG/DL
PLATELET # BLD AUTO: 306 K/UL
POTASSIUM SERPL-SCNC: 4.5 MMOL/L
PROT SERPL-MCNC: 7 G/DL
RBC # BLD: 4.47 M/UL
RBC # FLD: 13.2 %
SODIUM SERPL-SCNC: 139 MMOL/L
TESTOST SERPL-MCNC: 140 NG/DL
TRIGL SERPL-MCNC: 176 MG/DL
TSH SERPL-ACNC: 3.28 UIU/ML
WBC # FLD AUTO: 10.39 K/UL

## 2024-02-27 ENCOUNTER — TRANSCRIPTION ENCOUNTER (OUTPATIENT)
Age: 19
End: 2024-02-27

## 2024-03-07 ENCOUNTER — TRANSCRIPTION ENCOUNTER (OUTPATIENT)
Age: 19
End: 2024-03-07

## 2024-03-29 ENCOUNTER — RX RENEWAL (OUTPATIENT)
Age: 19
End: 2024-03-29

## 2024-04-01 ENCOUNTER — TRANSCRIPTION ENCOUNTER (OUTPATIENT)
Age: 19
End: 2024-04-01

## 2024-04-03 ENCOUNTER — TRANSCRIPTION ENCOUNTER (OUTPATIENT)
Age: 19
End: 2024-04-03

## 2024-04-08 ENCOUNTER — TRANSCRIPTION ENCOUNTER (OUTPATIENT)
Age: 19
End: 2024-04-08

## 2024-05-21 ENCOUNTER — TRANSCRIPTION ENCOUNTER (OUTPATIENT)
Age: 19
End: 2024-05-21

## 2024-05-30 ENCOUNTER — TRANSCRIPTION ENCOUNTER (OUTPATIENT)
Age: 19
End: 2024-05-30

## 2024-05-30 RX ORDER — PANTOPRAZOLE 40 MG/1
40 TABLET, DELAYED RELEASE ORAL
Qty: 30 | Refills: 0 | Status: ACTIVE | COMMUNITY
Start: 2023-09-06 | End: 1900-01-01

## 2024-06-14 ENCOUNTER — TRANSCRIPTION ENCOUNTER (OUTPATIENT)
Age: 19
End: 2024-06-14

## 2024-06-14 RX ORDER — TESTOSTERONE CYPIONATE 200 MG/ML
200 INJECTION, SOLUTION INTRAMUSCULAR
Qty: 4 | Refills: 0 | Status: ACTIVE | COMMUNITY
Start: 2023-10-09 | End: 1900-01-01

## 2024-06-14 RX ORDER — SYRINGE, DISPOSABLE, 1 ML
1 ML SYRINGE, EMPTY DISPOSABLE MISCELLANEOUS
Qty: 1 | Refills: 3 | Status: ACTIVE | COMMUNITY
Start: 2023-10-09 | End: 1900-01-01

## 2024-07-10 ENCOUNTER — APPOINTMENT (OUTPATIENT)
Dept: TRANSGENDER CARE | Facility: CLINIC | Age: 19
End: 2024-07-10
Payer: MEDICAID

## 2024-07-10 VITALS
OXYGEN SATURATION: 99 % | HEIGHT: 65 IN | DIASTOLIC BLOOD PRESSURE: 86 MMHG | TEMPERATURE: 98.2 F | SYSTOLIC BLOOD PRESSURE: 118 MMHG | WEIGHT: 278 LBS | HEART RATE: 79 BPM | BODY MASS INDEX: 46.32 KG/M2

## 2024-07-10 DIAGNOSIS — E28.2 POLYCYSTIC OVARIAN SYNDROME: ICD-10-CM

## 2024-07-10 DIAGNOSIS — F64.9 GENDER IDENTITY DISORDER, UNSPECIFIED: ICD-10-CM

## 2024-07-10 PROCEDURE — 99214 OFFICE O/P EST MOD 30 MIN: CPT

## 2024-07-10 PROCEDURE — G2211 COMPLEX E/M VISIT ADD ON: CPT | Mod: NC,1L

## 2024-07-10 RX ORDER — METFORMIN ER 500 MG 500 MG/1
500 TABLET ORAL
Qty: 360 | Refills: 2 | Status: ACTIVE | COMMUNITY
Start: 2024-07-10 | End: 1900-01-01

## 2024-07-11 ENCOUNTER — TRANSCRIPTION ENCOUNTER (OUTPATIENT)
Age: 19
End: 2024-07-11

## 2024-07-11 PROCEDURE — 98968 PH1 ASSMT&MGMT NQHP 21-30: CPT

## 2024-07-15 ENCOUNTER — TRANSCRIPTION ENCOUNTER (OUTPATIENT)
Age: 19
End: 2024-07-15

## 2024-07-15 LAB
ALBUMIN SERPL ELPH-MCNC: 4.5 G/DL
ALP BLD-CCNC: 65 U/L
ALT SERPL-CCNC: 42 U/L
ANION GAP SERPL CALC-SCNC: 18 MMOL/L
AST SERPL-CCNC: 23 U/L
BASOPHILS NFR BLD AUTO: 0.5 %
BILIRUB SERPL-MCNC: 0.2 MG/DL
BUN SERPL-MCNC: 12 MG/DL
CALCIUM SERPL-MCNC: 10.1 MG/DL
CHLORIDE SERPL-SCNC: 102 MMOL/L
CO2 SERPL-SCNC: 19 MMOL/L
CREAT SERPL-MCNC: 0.61 MG/DL
EGFR: 130 ML/MIN/1.73M2
EOSINOPHIL # BLD AUTO: 0.13 K/UL
EOSINOPHIL NFR BLD AUTO: 1.3 %
ESTIMATED AVERAGE GLUCOSE: 111 MG/DL
ESTRADIOL SERPL-MCNC: 43 PG/ML
GLUCOSE SERPL-MCNC: 127 MG/DL
HBA1C MFR BLD HPLC: 5.5 %
HDLC SERPL-MCNC: 22 MG/DL
HGB BLD-MCNC: 13.1 G/DL
IMM GRANULOCYTES NFR BLD AUTO: 1 %
LDLC SERPL CALC-MCNC: 67 MG/DL
LYMPHOCYTES # BLD AUTO: 2.95 K/UL
LYMPHOCYTES NFR BLD AUTO: 28.4 %
MAN DIFF?: NORMAL
MCHC RBC-ENTMCNC: 29.7 PG
MCHC RBC-ENTMCNC: 33.2 GM/DL
MCV RBC AUTO: 89.3 FL
MONOCYTES # BLD AUTO: 1.04 K/UL
MONOCYTES NFR BLD AUTO: 10 %
NONHDLC SERPL-MCNC: 119 MG/DL
PLATELET # BLD AUTO: 325 K/UL
POTASSIUM SERPL-SCNC: 4.2 MMOL/L
PROT SERPL-MCNC: 7.4 G/DL
RBC # BLD: 4.41 M/UL
SODIUM SERPL-SCNC: 140 MMOL/L
TRIGL SERPL-MCNC: 328 MG/DL
WBC # FLD AUTO: 10.4 K/UL

## 2024-08-12 ENCOUNTER — APPOINTMENT (OUTPATIENT)
Dept: OBGYN | Facility: CLINIC | Age: 19
End: 2024-08-12
Payer: MEDICAID

## 2024-08-12 VITALS
WEIGHT: 280 LBS | BODY MASS INDEX: 46.65 KG/M2 | DIASTOLIC BLOOD PRESSURE: 80 MMHG | SYSTOLIC BLOOD PRESSURE: 120 MMHG | HEIGHT: 65 IN

## 2024-08-12 DIAGNOSIS — N95.2 POSTMENOPAUSAL ATROPHIC VAGINITIS: ICD-10-CM

## 2024-08-12 DIAGNOSIS — Z00.00 ENCOUNTER FOR GENERAL ADULT MEDICAL EXAMINATION W/OUT ABNORMAL FINDINGS: ICD-10-CM

## 2024-08-12 DIAGNOSIS — N60.19 DIFFUSE CYSTIC MASTOPATHY OF UNSPECIFIED BREAST: ICD-10-CM

## 2024-08-12 LAB
BILIRUB UR QL STRIP: NORMAL
CLARITY UR: CLEAR
COLLECTION METHOD: NORMAL
GLUCOSE UR-MCNC: NORMAL
HCG UR QL: 0.2 EU/DL
HGB UR QL STRIP.AUTO: NORMAL
KETONES UR-MCNC: NORMAL
LEUKOCYTE ESTERASE UR QL STRIP: NORMAL
NITRITE UR QL STRIP: NORMAL
PH UR STRIP: 5.5
PROT UR STRIP-MCNC: NORMAL
SP GR UR STRIP: 1.03

## 2024-08-12 PROCEDURE — 81002 URINALYSIS NONAUTO W/O SCOPE: CPT

## 2024-08-12 PROCEDURE — 99385 PREV VISIT NEW AGE 18-39: CPT

## 2024-08-12 RX ORDER — ESTRADIOL 0.1 MG/G
0.1 CREAM VAGINAL
Qty: 1 | Refills: 5 | Status: ACTIVE | COMMUNITY
Start: 2024-08-12 | End: 1900-01-01

## 2024-08-13 LAB
APPEARANCE: CLEAR
BILIRUBIN URINE: NEGATIVE
BLOOD URINE: NEGATIVE
C TRACH RRNA SPEC QL NAA+PROBE: NOT DETECTED
COLOR: NORMAL
GLUCOSE QUALITATIVE U: NEGATIVE MG/DL
KETONES URINE: ABNORMAL MG/DL
LEUKOCYTE ESTERASE URINE: NEGATIVE
N GONORRHOEA RRNA SPEC QL NAA+PROBE: NOT DETECTED
NITRITE URINE: NEGATIVE
PH URINE: 5.5
PROTEIN URINE: NORMAL MG/DL
SOURCE AMPLIFICATION: NORMAL
SPECIFIC GRAVITY URINE: 1.03
UROBILINOGEN URINE: 0.2 MG/DL

## 2024-08-14 LAB — HPV HIGH+LOW RISK DNA PNL CVX: NOT DETECTED

## 2024-08-19 LAB — CYTOLOGY CVX/VAG DOC THIN PREP: NORMAL

## 2024-11-06 PROCEDURE — 90847 FAMILY PSYTX W/PT 50 MIN: CPT

## 2024-12-11 PROCEDURE — 98967 PH1 ASSMT&MGMT NQHP 11-20: CPT

## 2024-12-18 ENCOUNTER — APPOINTMENT (OUTPATIENT)
Dept: OBGYN | Facility: CLINIC | Age: 19
End: 2024-12-18
Payer: MEDICAID

## 2024-12-18 DIAGNOSIS — Z30.430 ENCOUNTER FOR INSERTION OF INTRAUTERINE CONTRACEPTIVE DEVICE: ICD-10-CM

## 2024-12-18 DIAGNOSIS — Z30.09 ENCOUNTER FOR OTHER GENERAL COUNSELING AND ADVICE ON CONTRACEPTION: ICD-10-CM

## 2024-12-18 LAB
HCG UR QL: NEGATIVE
QUALITY CONTROL: YES

## 2024-12-18 PROCEDURE — 99212 OFFICE O/P EST SF 10 MIN: CPT | Mod: 25

## 2024-12-18 PROCEDURE — 58300 INSERT INTRAUTERINE DEVICE: CPT | Mod: 53

## 2024-12-18 PROCEDURE — 81025 URINE PREGNANCY TEST: CPT

## 2024-12-18 RX ORDER — KETOROLAC TROMETHAMINE 30 MG/ML
30 INJECTION, SOLUTION INTRAMUSCULAR; INTRAVENOUS
Qty: 1 | Refills: 0 | Status: COMPLETED | OUTPATIENT
Start: 2024-12-18

## 2024-12-18 RX ADMIN — KETOROLAC TROMETHAMINE 0 MG/ML: 30 INJECTION, SOLUTION INTRAMUSCULAR; INTRAVENOUS at 00:00

## 2024-12-19 ENCOUNTER — NON-APPOINTMENT (OUTPATIENT)
Age: 19
End: 2024-12-19

## 2024-12-19 ENCOUNTER — TRANSCRIPTION ENCOUNTER (OUTPATIENT)
Age: 19
End: 2024-12-19

## 2024-12-19 LAB
C TRACH RRNA SPEC QL NAA+PROBE: NOT DETECTED
N GONORRHOEA RRNA SPEC QL NAA+PROBE: NOT DETECTED
SOURCE AMPLIFICATION: NORMAL

## 2024-12-20 ENCOUNTER — TRANSCRIPTION ENCOUNTER (OUTPATIENT)
Age: 19
End: 2024-12-20

## 2025-02-03 ENCOUNTER — NON-APPOINTMENT (OUTPATIENT)
Age: 20
End: 2025-02-03

## 2025-02-04 ENCOUNTER — TRANSCRIPTION ENCOUNTER (OUTPATIENT)
Age: 20
End: 2025-02-04

## 2025-02-04 DIAGNOSIS — Z00.00 ENCOUNTER FOR GENERAL ADULT MEDICAL EXAMINATION W/OUT ABNORMAL FINDINGS: ICD-10-CM

## 2025-02-06 ENCOUNTER — TRANSCRIPTION ENCOUNTER (OUTPATIENT)
Age: 20
End: 2025-02-06

## 2025-02-07 ENCOUNTER — APPOINTMENT (OUTPATIENT)
Dept: PLASTIC SURGERY | Facility: CLINIC | Age: 20
End: 2025-02-07
Payer: MEDICAID

## 2025-02-07 VITALS
DIASTOLIC BLOOD PRESSURE: 94 MMHG | WEIGHT: 280 LBS | HEIGHT: 65 IN | OXYGEN SATURATION: 99 % | BODY MASS INDEX: 46.65 KG/M2 | HEART RATE: 93 BPM | SYSTOLIC BLOOD PRESSURE: 130 MMHG

## 2025-02-07 DIAGNOSIS — F64.9 GENDER IDENTITY DISORDER, UNSPECIFIED: ICD-10-CM

## 2025-02-07 DIAGNOSIS — Z80.3 FAMILY HISTORY OF MALIGNANT NEOPLASM OF BREAST: ICD-10-CM

## 2025-02-07 PROCEDURE — 99204 OFFICE O/P NEW MOD 45 MIN: CPT

## 2025-02-07 RX ORDER — KETOCONAZOLE 20 MG/ML
SUSPENSION TOPICAL
Refills: 0 | Status: ACTIVE | COMMUNITY

## 2025-02-07 RX ORDER — FLUOXETINE HCL 10 MG
TABLET ORAL
Refills: 0 | Status: ACTIVE | COMMUNITY

## 2025-02-07 RX ORDER — MULTIVITAMIN,THER AND MINERALS
TABLET ORAL
Refills: 0 | Status: ACTIVE | COMMUNITY

## 2025-02-07 RX ORDER — MECLIZINE HCL 25 MG/1
25 TABLET ORAL
Refills: 0 | Status: ACTIVE | COMMUNITY

## 2025-02-10 ENCOUNTER — TRANSCRIPTION ENCOUNTER (OUTPATIENT)
Age: 20
End: 2025-02-10

## 2025-02-21 ENCOUNTER — APPOINTMENT (OUTPATIENT)
Dept: TRANSGENDER CARE | Facility: CLINIC | Age: 20
End: 2025-02-21
Payer: MEDICAID

## 2025-02-21 DIAGNOSIS — F60.3 BORDERLINE PERSONALITY DISORDER: ICD-10-CM

## 2025-02-21 DIAGNOSIS — F64.9 GENDER IDENTITY DISORDER, UNSPECIFIED: ICD-10-CM

## 2025-02-21 DIAGNOSIS — E28.2 POLYCYSTIC OVARIAN SYNDROME: ICD-10-CM

## 2025-02-21 PROCEDURE — G2211 COMPLEX E/M VISIT ADD ON: CPT | Mod: NC,93

## 2025-02-21 PROCEDURE — 99214 OFFICE O/P EST MOD 30 MIN: CPT | Mod: 93

## 2025-02-24 ENCOUNTER — APPOINTMENT (OUTPATIENT)
Dept: HEMATOLOGY ONCOLOGY | Facility: CLINIC | Age: 20
End: 2025-02-24

## 2025-03-07 ENCOUNTER — APPOINTMENT (OUTPATIENT)
Dept: TRANSGENDER CARE | Facility: CLINIC | Age: 20
End: 2025-03-07

## 2025-03-07 PROCEDURE — 98968 PH1 ASSMT&MGMT NQHP 21-30: CPT

## 2025-03-26 ENCOUNTER — APPOINTMENT (OUTPATIENT)
Dept: TRANSGENDER CARE | Facility: CLINIC | Age: 20
End: 2025-03-26
Payer: MEDICAID

## 2025-03-26 DIAGNOSIS — R73.03 PREDIABETES.: ICD-10-CM

## 2025-03-26 DIAGNOSIS — F64.9 GENDER IDENTITY DISORDER, UNSPECIFIED: ICD-10-CM

## 2025-03-26 DIAGNOSIS — E28.2 POLYCYSTIC OVARIAN SYNDROME: ICD-10-CM

## 2025-03-26 PROCEDURE — 99214 OFFICE O/P EST MOD 30 MIN: CPT | Mod: 93

## 2025-03-26 PROCEDURE — G2211 COMPLEX E/M VISIT ADD ON: CPT | Mod: NC,93

## 2025-03-26 RX ORDER — TESTOSTERONE ENANTHATE 200 MG/ML
200 INJECTION, SOLUTION INTRAMUSCULAR WEEKLY
Qty: 1 | Refills: 0 | Status: ACTIVE | COMMUNITY
Start: 2025-03-26 | End: 1900-01-01

## 2025-06-04 PROCEDURE — ZZZZZ: CPT

## 2025-06-10 ENCOUNTER — APPOINTMENT (OUTPATIENT)
Dept: ULTRASOUND IMAGING | Facility: CLINIC | Age: 20
End: 2025-06-10
Payer: MEDICAID

## 2025-06-10 ENCOUNTER — RESULT REVIEW (OUTPATIENT)
Age: 20
End: 2025-06-10

## 2025-06-10 PROCEDURE — 76641 ULTRASOUND BREAST COMPLETE: CPT | Mod: 50

## 2025-06-12 ENCOUNTER — APPOINTMENT (OUTPATIENT)
Dept: GASTROENTEROLOGY | Facility: CLINIC | Age: 20
End: 2025-06-12

## 2025-06-13 ENCOUNTER — APPOINTMENT (OUTPATIENT)
Dept: GASTROENTEROLOGY | Facility: HOSPITAL | Age: 20
End: 2025-06-13

## 2025-07-25 ENCOUNTER — APPOINTMENT (OUTPATIENT)
Dept: TRANSGENDER CARE | Facility: CLINIC | Age: 20
End: 2025-07-25
Payer: MEDICAID

## 2025-07-25 VITALS
TEMPERATURE: 98.3 F | HEART RATE: 102 BPM | HEIGHT: 66 IN | BODY MASS INDEX: 45.32 KG/M2 | OXYGEN SATURATION: 97 % | SYSTOLIC BLOOD PRESSURE: 116 MMHG | WEIGHT: 282 LBS | DIASTOLIC BLOOD PRESSURE: 82 MMHG

## 2025-07-25 DIAGNOSIS — E83.52 HYPERCALCEMIA: ICD-10-CM

## 2025-07-25 DIAGNOSIS — E28.2 POLYCYSTIC OVARIAN SYNDROME: ICD-10-CM

## 2025-07-25 DIAGNOSIS — F64.9 GENDER IDENTITY DISORDER, UNSPECIFIED: ICD-10-CM

## 2025-07-25 DIAGNOSIS — R73.03 PREDIABETES.: ICD-10-CM

## 2025-07-25 PROCEDURE — 99214 OFFICE O/P EST MOD 30 MIN: CPT

## 2025-07-25 PROCEDURE — G2211 COMPLEX E/M VISIT ADD ON: CPT | Mod: NC

## 2025-07-25 RX ORDER — METFORMIN HYDROCHLORIDE 1000 MG/1
1000 TABLET, COATED ORAL TWICE DAILY
Qty: 180 | Refills: 3 | Status: ACTIVE | COMMUNITY
Start: 2025-07-25 | End: 1900-01-01

## 2025-07-30 ENCOUNTER — TRANSCRIPTION ENCOUNTER (OUTPATIENT)
Age: 20
End: 2025-07-30

## 2025-07-30 DIAGNOSIS — L65.9 NONSCARRING HAIR LOSS, UNSPECIFIED: ICD-10-CM

## 2025-07-30 PROCEDURE — 90834 PSYTX W PT 45 MINUTES: CPT

## 2025-07-31 ENCOUNTER — TRANSCRIPTION ENCOUNTER (OUTPATIENT)
Age: 20
End: 2025-07-31

## 2025-07-31 LAB
24R-OH-CALCIDIOL SERPL-MCNC: 31.5 PG/ML
25(OH)D3 SERPL-MCNC: 49.4 NG/ML
ALBUMIN SERPL ELPH-MCNC: 4.4 G/DL
ALP BLD-CCNC: 64 U/L
ALT SERPL-CCNC: 50 U/L
ANION GAP SERPL CALC-SCNC: 17 MMOL/L
AST SERPL-CCNC: 26 U/L
BASOPHILS # BLD AUTO: 0.04 K/UL
BASOPHILS NFR BLD AUTO: 0.4 %
BILIRUB SERPL-MCNC: 0.2 MG/DL
BUN SERPL-MCNC: 17 MG/DL
CALCIUM SERPL-MCNC: 10.2 MG/DL
CALCIUM SERPL-MCNC: 10.2 MG/DL
CHLORIDE SERPL-SCNC: 99 MMOL/L
CHOLEST SERPL-MCNC: 136 MG/DL
CO2 SERPL-SCNC: 21 MMOL/L
CREAT SERPL-MCNC: 0.61 MG/DL
EGFRCR SERPLBLD CKD-EPI 2021: 130 ML/MIN/1.73M2
EOSINOPHIL # BLD AUTO: 0.12 K/UL
EOSINOPHIL NFR BLD AUTO: 1.2 %
ESTIMATED AVERAGE GLUCOSE: 114 MG/DL
ESTRADIOL SERPL-MCNC: 43 PG/ML
GLUCOSE SERPL-MCNC: 89 MG/DL
HBA1C MFR BLD HPLC: 5.6 %
HCT VFR BLD CALC: 42 %
HDLC SERPL-MCNC: 19 MG/DL
HGB BLD-MCNC: 13.5 G/DL
IMM GRANULOCYTES NFR BLD AUTO: 0.7 %
LDLC SERPL-MCNC: 77 MG/DL
LYMPHOCYTES # BLD AUTO: 2.33 K/UL
LYMPHOCYTES NFR BLD AUTO: 23.7 %
MAGNESIUM SERPL-MCNC: 1.8 MG/DL
MAN DIFF?: NORMAL
MCHC RBC-ENTMCNC: 28.5 PG
MCHC RBC-ENTMCNC: 32.1 G/DL
MCV RBC AUTO: 88.6 FL
MONOCYTES # BLD AUTO: 1.23 K/UL
MONOCYTES NFR BLD AUTO: 12.5 %
NEUTROPHILS # BLD AUTO: 6.06 K/UL
NEUTROPHILS NFR BLD AUTO: 61.5 %
NONHDLC SERPL-MCNC: 117 MG/DL
PARATHYROID HORMONE INTACT: 12 PG/ML
PHOSPHATE SERPL-MCNC: 4.5 MG/DL
PLATELET # BLD AUTO: 288 K/UL
POTASSIUM SERPL-SCNC: 4.6 MMOL/L
PROT SERPL-MCNC: 7.1 G/DL
RBC # BLD: 4.74 M/UL
RBC # FLD: 13.2 %
SODIUM SERPL-SCNC: 138 MMOL/L
TESTOST SERPL-MCNC: 359 NG/DL
TRIGL SERPL-MCNC: 242 MG/DL
TSH SERPL-ACNC: 3.94 UIU/ML
WBC # FLD AUTO: 9.85 K/UL

## 2025-08-01 ENCOUNTER — TRANSCRIPTION ENCOUNTER (OUTPATIENT)
Age: 20
End: 2025-08-01

## 2025-08-04 ENCOUNTER — TRANSCRIPTION ENCOUNTER (OUTPATIENT)
Age: 20
End: 2025-08-04

## 2025-08-06 ENCOUNTER — TRANSCRIPTION ENCOUNTER (OUTPATIENT)
Age: 20
End: 2025-08-06

## 2025-08-06 PROCEDURE — 90834 PSYTX W PT 45 MINUTES: CPT

## 2025-08-06 RX ORDER — FINASTERIDE 1 MG/1
1 TABLET ORAL DAILY
Qty: 90 | Refills: 0 | Status: COMPLETED | COMMUNITY
Start: 2025-07-25 | End: 2025-08-06

## 2025-08-06 RX ORDER — FINASTERIDE 5 MG/1
5 TABLET, FILM COATED ORAL DAILY
Qty: 8 | Refills: 5 | Status: ACTIVE | COMMUNITY
Start: 2025-08-06 | End: 1900-01-01

## 2025-08-12 RX ORDER — SYRINGE, DISPOSABLE, 1 ML
1 ML SYRINGE, EMPTY DISPOSABLE MISCELLANEOUS
Qty: 20 | Refills: 3 | Status: ACTIVE | COMMUNITY
Start: 2025-08-12 | End: 1900-01-01

## 2025-08-13 PROCEDURE — 90834 PSYTX W PT 45 MINUTES: CPT

## 2025-08-13 PROCEDURE — 90785 PSYTX COMPLEX INTERACTIVE: CPT

## 2025-09-18 ENCOUNTER — TRANSCRIPTION ENCOUNTER (OUTPATIENT)
Age: 20
End: 2025-09-18